# Patient Record
Sex: FEMALE | Race: ASIAN | NOT HISPANIC OR LATINO | URBAN - METROPOLITAN AREA
[De-identification: names, ages, dates, MRNs, and addresses within clinical notes are randomized per-mention and may not be internally consistent; named-entity substitution may affect disease eponyms.]

---
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
## 2021-02-12 ENCOUNTER — APPOINTMENT (OUTPATIENT)
Dept: INTERNAL MEDICINE | Facility: CLINIC | Age: 21
End: 2021-02-12
Payer: COMMERCIAL

## 2021-02-12 VITALS
HEIGHT: 66 IN | BODY MASS INDEX: 22.18 KG/M2 | DIASTOLIC BLOOD PRESSURE: 70 MMHG | WEIGHT: 138 LBS | HEART RATE: 82 BPM | TEMPERATURE: 98.4 F | OXYGEN SATURATION: 97 % | SYSTOLIC BLOOD PRESSURE: 100 MMHG

## 2021-02-12 PROCEDURE — 99072 ADDL SUPL MATRL&STAF TM PHE: CPT

## 2021-02-12 PROCEDURE — 99213 OFFICE O/P EST LOW 20 MIN: CPT

## 2021-02-12 NOTE — PLAN
[FreeTextEntry1] : Depression screening negative\par doing well on lexapro 10 mg. will continue same dose\par f/u 6 months for physical

## 2021-02-12 NOTE — HISTORY OF PRESENT ILLNESS
[FreeTextEntry1] : f/u anxiety [de-identified] : 19 y/o female with h/o anxiety who presents for follow up. She is on lexapro 10 mg. She feels very good on this dose. Able to sleep. Appetite is ok. headaches resolved. Doing well in school. She was away and didn’t have her pills for 2 days. She felt a little off and felt SOB. Feels better now that she is back on it.

## 2021-07-13 ENCOUNTER — APPOINTMENT (OUTPATIENT)
Dept: INTERNAL MEDICINE | Facility: CLINIC | Age: 21
End: 2021-07-13
Payer: COMMERCIAL

## 2021-07-13 VITALS
TEMPERATURE: 97.2 F | SYSTOLIC BLOOD PRESSURE: 90 MMHG | BODY MASS INDEX: 24.11 KG/M2 | HEIGHT: 66 IN | WEIGHT: 150 LBS | DIASTOLIC BLOOD PRESSURE: 70 MMHG | OXYGEN SATURATION: 99 % | HEART RATE: 87 BPM

## 2021-07-13 PROCEDURE — 99214 OFFICE O/P EST MOD 30 MIN: CPT

## 2021-07-13 NOTE — HEALTH RISK ASSESSMENT
[] : No [0] : 2) Feeling down, depressed, or hopeless: Not at all (0) [PHQ-2 Negative - No further assessment needed] : PHQ-2 Negative - No further assessment needed [[id number]] : 0

## 2021-07-13 NOTE — HISTORY OF PRESENT ILLNESS
[Parent] : parent [FreeTextEntry1] : f/u anxiety [de-identified] : 22 y/o female with h/o anxiety who presents for follow up. She is here with her mother. According  to her mother the patient has worseining anxiety symptoms. She mentioned that the patient had a dream that she wondered if it is real. Her mother contacted a therapist about 6 weeks ago. The therapist told the patient that may want to increase the dose of lexapro. She does not think she needs it increased. her appeitite is ok. She is sleeping. She is socializing. She is taking 1 class over the summer. S

## 2021-07-13 NOTE — PLAN
[FreeTextEntry1] : Depression screening negative\par For now will continue lexapro 10 mg. She will return in 1 month without her mother. If she feels in the meantime she wants to increase she will call me

## 2021-07-14 ENCOUNTER — TRANSCRIPTION ENCOUNTER (OUTPATIENT)
Age: 21
End: 2021-07-14

## 2021-08-19 ENCOUNTER — APPOINTMENT (OUTPATIENT)
Dept: INTERNAL MEDICINE | Facility: CLINIC | Age: 21
End: 2021-08-19
Payer: COMMERCIAL

## 2021-08-19 VITALS
HEIGHT: 66 IN | HEART RATE: 87 BPM | TEMPERATURE: 98.2 F | OXYGEN SATURATION: 98 % | SYSTOLIC BLOOD PRESSURE: 130 MMHG | DIASTOLIC BLOOD PRESSURE: 60 MMHG | BODY MASS INDEX: 24.11 KG/M2 | WEIGHT: 150 LBS

## 2021-08-19 DIAGNOSIS — F41.9 ANXIETY DISORDER, UNSPECIFIED: ICD-10-CM

## 2021-08-19 PROCEDURE — 99213 OFFICE O/P EST LOW 20 MIN: CPT

## 2021-08-19 NOTE — HISTORY OF PRESENT ILLNESS
[FreeTextEntry1] : f/u anxiety [de-identified] : 23 year-old female with history of anxiety who is here for follow-up.  She is on Lexapro 10 mg.  Last visit she was here with her mother who thought that She may need to increase the dose of Lexapro to 20 mg.  They stated that her therapist thought it may have been a good idea.  Since last visit she has been doing fine on Lexapro 10 mg.  The therapist said that she no longer needs to follow-up with him.

## 2021-08-19 NOTE — PLAN
[FreeTextEntry1] : She is stable on Lexapro 10 mg.  We will continue at that dosage.\par Return for physical

## 2021-09-27 ENCOUNTER — TRANSCRIPTION ENCOUNTER (OUTPATIENT)
Age: 21
End: 2021-09-27

## 2021-12-23 ENCOUNTER — NON-APPOINTMENT (OUTPATIENT)
Age: 21
End: 2021-12-23

## 2021-12-23 ENCOUNTER — APPOINTMENT (OUTPATIENT)
Dept: OPHTHALMOLOGY | Facility: CLINIC | Age: 21
End: 2021-12-23
Payer: COMMERCIAL

## 2021-12-23 PROCEDURE — 92004 COMPRE OPH EXAM NEW PT 1/>: CPT

## 2022-02-18 ENCOUNTER — APPOINTMENT (OUTPATIENT)
Dept: INTERNAL MEDICINE | Facility: CLINIC | Age: 22
End: 2022-02-18
Payer: COMMERCIAL

## 2022-02-18 VITALS
BODY MASS INDEX: 27.16 KG/M2 | OXYGEN SATURATION: 99 % | RESPIRATION RATE: 16 BRPM | HEART RATE: 90 BPM | TEMPERATURE: 97.7 F | HEIGHT: 66 IN | DIASTOLIC BLOOD PRESSURE: 82 MMHG | SYSTOLIC BLOOD PRESSURE: 135 MMHG | WEIGHT: 169 LBS

## 2022-02-18 DIAGNOSIS — Z00.00 ENCOUNTER FOR GENERAL ADULT MEDICAL EXAMINATION W/OUT ABNORMAL FINDINGS: ICD-10-CM

## 2022-02-18 PROCEDURE — 36415 COLL VENOUS BLD VENIPUNCTURE: CPT

## 2022-02-18 PROCEDURE — 99395 PREV VISIT EST AGE 18-39: CPT | Mod: 25

## 2022-02-18 RX ORDER — ESCITALOPRAM OXALATE 10 MG/1
10 TABLET ORAL
Qty: 90 | Refills: 0 | Status: DISCONTINUED | COMMUNITY
Start: 2020-07-23 | End: 2022-02-18

## 2022-02-18 NOTE — PLAN
[FreeTextEntry1] : Depression screening negative. stable on lexapro. \par BP is controlled\par Work to improve weight with healthy diet and exercise\par Check labs today, including CBC, CMP, TSH, HbA1c, lipids. Blood collected in office.\par pap\par letter written for school\par f/u 6  months or prn

## 2022-02-18 NOTE — HISTORY OF PRESENT ILLNESS
[FreeTextEntry1] : Annual physical [de-identified] : 21-year-old female with history of anxiety who is here for an annual physical.  She is on Lexapro 20 mg.  The dose was increased to 20 mg in September.  She states that she took a semester off school as she was adjusting to the dose.  She requests a letter because she would like to get reimbursed her tuition.\par never had a pap. not sexually active\par exercise: going to the gym occasionally\par diet is healthy\par

## 2022-02-18 NOTE — HEALTH RISK ASSESSMENT
[Never] : Never [No] : No [0] : 2) Feeling down, depressed, or hopeless: Not at all (0) [With Family] : lives with family [Student] : student [PHQ-2 Negative - No further assessment needed] : PHQ-2 Negative - No further assessment needed [HIV test declined] : HIV test declined [Hepatitis C test declined] : Hepatitis C test declined [Single] : single [[id number]] : 0 [Sexually Active] : not sexually active [Reports changes in hearing] : Reports no changes in hearing [Reports changes in vision] : Reports no changes in vision [Reports changes in dental health] : Reports no changes in dental health [de-identified] : NCC

## 2022-03-03 ENCOUNTER — TRANSCRIPTION ENCOUNTER (OUTPATIENT)
Age: 22
End: 2022-03-03

## 2022-03-03 DIAGNOSIS — E55.9 VITAMIN D DEFICIENCY, UNSPECIFIED: ICD-10-CM

## 2022-03-03 LAB
25(OH)D3 SERPL-MCNC: 6.5 NG/ML
ALBUMIN SERPL ELPH-MCNC: 4.8 G/DL
ALP BLD-CCNC: 80 U/L
ALT SERPL-CCNC: 11 U/L
ANION GAP SERPL CALC-SCNC: 15 MMOL/L
APPEARANCE: ABNORMAL
AST SERPL-CCNC: 13 U/L
BACTERIA: NEGATIVE
BASOPHILS # BLD AUTO: 0.03 K/UL
BASOPHILS NFR BLD AUTO: 0.4 %
BILIRUB SERPL-MCNC: 0.2 MG/DL
BILIRUBIN URINE: NEGATIVE
BLOOD URINE: ABNORMAL
BUN SERPL-MCNC: 17 MG/DL
CALCIUM SERPL-MCNC: 9.6 MG/DL
CHLORIDE SERPL-SCNC: 103 MMOL/L
CHOLEST SERPL-MCNC: 127 MG/DL
CO2 SERPL-SCNC: 20 MMOL/L
COLOR: YELLOW
CREAT SERPL-MCNC: 0.59 MG/DL
EOSINOPHIL # BLD AUTO: 0.17 K/UL
EOSINOPHIL NFR BLD AUTO: 2.2 %
ESTIMATED AVERAGE GLUCOSE: 117 MG/DL
FERRITIN SERPL-MCNC: 25 NG/ML
GLUCOSE QUALITATIVE U: NEGATIVE
GLUCOSE SERPL-MCNC: 85 MG/DL
HBA1C MFR BLD HPLC: 5.7 %
HCT VFR BLD CALC: 38.4 %
HDLC SERPL-MCNC: 44 MG/DL
HGB BLD-MCNC: 11.9 G/DL
HYALINE CASTS: 1 /LPF
IMM GRANULOCYTES NFR BLD AUTO: 0.4 %
IRON SATN MFR SERPL: 12 %
IRON SERPL-MCNC: 50 UG/DL
KETONES URINE: NEGATIVE
LDLC SERPL CALC-MCNC: 57 MG/DL
LEUKOCYTE ESTERASE URINE: ABNORMAL
LYMPHOCYTES # BLD AUTO: 2.08 K/UL
LYMPHOCYTES NFR BLD AUTO: 27 %
MAN DIFF?: NORMAL
MCHC RBC-ENTMCNC: 25.8 PG
MCHC RBC-ENTMCNC: 31 GM/DL
MCV RBC AUTO: 83.3 FL
MICROSCOPIC-UA: NORMAL
MONOCYTES # BLD AUTO: 0.51 K/UL
MONOCYTES NFR BLD AUTO: 6.6 %
NEUTROPHILS # BLD AUTO: 4.88 K/UL
NEUTROPHILS NFR BLD AUTO: 63.4 %
NITRITE URINE: NEGATIVE
NONHDLC SERPL-MCNC: 83 MG/DL
PH URINE: 6
PLATELET # BLD AUTO: 373 K/UL
POTASSIUM SERPL-SCNC: 3.9 MMOL/L
PROT SERPL-MCNC: 7.8 G/DL
PROTEIN URINE: NORMAL
RBC # BLD: 4.61 M/UL
RBC # FLD: 14.6 %
RED BLOOD CELLS URINE: 4 /HPF
SODIUM SERPL-SCNC: 139 MMOL/L
SPECIFIC GRAVITY URINE: 1.03
SQUAMOUS EPITHELIAL CELLS: 3 /HPF
TIBC SERPL-MCNC: 414 UG/DL
TRIGL SERPL-MCNC: 130 MG/DL
TSH SERPL-ACNC: 2.02 UIU/ML
UIBC SERPL-MCNC: 364 UG/DL
UROBILINOGEN URINE: NORMAL
VIT B12 SERPL-MCNC: 287 PG/ML
WBC # FLD AUTO: 7.7 K/UL
WHITE BLOOD CELLS URINE: 9 /HPF

## 2022-03-03 RX ORDER — CHOLECALCIFEROL (VITAMIN D3) 1250 MCG
1.25 MG CAPSULE ORAL
Qty: 12 | Refills: 1 | Status: ACTIVE | COMMUNITY
Start: 2022-03-03 | End: 1900-01-01

## 2022-03-07 ENCOUNTER — TRANSCRIPTION ENCOUNTER (OUTPATIENT)
Age: 22
End: 2022-03-07

## 2022-04-08 ENCOUNTER — TRANSCRIPTION ENCOUNTER (OUTPATIENT)
Age: 22
End: 2022-04-08

## 2022-04-08 ENCOUNTER — NON-APPOINTMENT (OUTPATIENT)
Age: 22
End: 2022-04-08

## 2022-04-11 ENCOUNTER — TRANSCRIPTION ENCOUNTER (OUTPATIENT)
Age: 22
End: 2022-04-11

## 2022-04-26 ENCOUNTER — TRANSCRIPTION ENCOUNTER (OUTPATIENT)
Age: 22
End: 2022-04-26

## 2022-05-24 ENCOUNTER — APPOINTMENT (OUTPATIENT)
Dept: PSYCHIATRY | Facility: CLINIC | Age: 22
End: 2022-05-24
Payer: COMMERCIAL

## 2022-05-24 DIAGNOSIS — F32.A DEPRESSION, UNSPECIFIED: ICD-10-CM

## 2022-05-24 PROCEDURE — 99205 OFFICE O/P NEW HI 60 MIN: CPT

## 2022-05-24 RX ORDER — CHLORHEXIDINE GLUCONATE 4 %
LIQUID (ML) TOPICAL
Refills: 0 | Status: COMPLETED | COMMUNITY
End: 2022-05-24

## 2022-06-13 ENCOUNTER — APPOINTMENT (OUTPATIENT)
Dept: PSYCHIATRY | Facility: CLINIC | Age: 22
End: 2022-06-13
Payer: COMMERCIAL

## 2022-06-13 PROCEDURE — 99214 OFFICE O/P EST MOD 30 MIN: CPT

## 2022-06-13 RX ORDER — BUPROPION HYDROCHLORIDE 75 MG/1
75 TABLET, FILM COATED ORAL
Qty: 30 | Refills: 0 | Status: COMPLETED | COMMUNITY
Start: 2022-05-24 | End: 2022-06-13

## 2022-07-11 ENCOUNTER — APPOINTMENT (OUTPATIENT)
Dept: PSYCHIATRY | Facility: CLINIC | Age: 22
End: 2022-07-11

## 2022-07-11 PROCEDURE — 99214 OFFICE O/P EST MOD 30 MIN: CPT

## 2022-07-26 ENCOUNTER — APPOINTMENT (OUTPATIENT)
Dept: PSYCHIATRY | Facility: CLINIC | Age: 22
End: 2022-07-26

## 2022-07-26 PROCEDURE — 99214 OFFICE O/P EST MOD 30 MIN: CPT

## 2022-08-21 ENCOUNTER — NON-APPOINTMENT (OUTPATIENT)
Age: 22
End: 2022-08-21

## 2022-09-26 ENCOUNTER — APPOINTMENT (OUTPATIENT)
Dept: PSYCHIATRY | Facility: CLINIC | Age: 22
End: 2022-09-26

## 2022-09-27 ENCOUNTER — APPOINTMENT (OUTPATIENT)
Dept: PSYCHIATRY | Facility: CLINIC | Age: 22
End: 2022-09-27

## 2022-09-27 PROCEDURE — 99214 OFFICE O/P EST MOD 30 MIN: CPT

## 2022-09-27 RX ORDER — BUPROPION HYDROCHLORIDE 150 MG/1
150 TABLET, EXTENDED RELEASE ORAL
Qty: 30 | Refills: 0 | Status: DISCONTINUED | COMMUNITY
Start: 2022-07-11

## 2022-12-19 ENCOUNTER — APPOINTMENT (OUTPATIENT)
Dept: PSYCHIATRY | Facility: CLINIC | Age: 22
End: 2022-12-19

## 2022-12-19 PROCEDURE — 99214 OFFICE O/P EST MOD 30 MIN: CPT

## 2022-12-19 NOTE — PAST MEDICAL HISTORY
[FreeTextEntry1] : H/O: depression and anxiety\par Inpatient hospitalization: denies. States she went to [facility] ER when she was 19 for depression June 2020. No medication given and just discharged her.  \par Past SI: denies\par Therapist: was seeing intermittently a therapist Dr. Moran but not seeing one currently.\par Psychiatrist: denpranav. was seeing her PMD for the Lexapro\par Medication trials: denies\par Current medications: Lexapro 20 mg for 2 years\par Firearms: denies \par Medical: denies\par

## 2022-12-19 NOTE — PHYSICAL EXAM
[None] : none [Anxious] : anxious [Normal] : alert, oriented to person, place, and time [Fair] : fair [FreeTextEntry8] : "I'm depressed." better with Lexapro [FreeTextEntry9] : better with Lexapro

## 2022-12-19 NOTE — DISCUSSION/SUMMARY
[FreeTextEntry1] : Assessment: Patient is a 23 yo [place] female with h/o depression and anxiety seen today for medication management. Patient is compliant with her medications, tolerating it well without any side effects. I-STOP was checked without any problems. \par \par PLAN:\par Continue Wellbutrin 300 mg PO QAM for depression, low motivation, energy, concentration and decrease SSRI induced sexual dysfunction.\par Continue Lexapro 20 mg PO QD for depression and anxiety\par - Discussed risks and benefits of medications including side effects of GI, and sexual with SSRI.  Alternative strategies including no intervention discussed with patient. Patient consents to current medications as prescribed.\par - Patient understands to contact clinic prn with concerns and agrees to call 911 or go to nearest ER if symptoms worsen.\par - Next appointment made in 6 month. Patient left the office without any distress.\par \par \par

## 2022-12-19 NOTE — CURRENT PSYCHIATRIC SYMPTOMS
[Depressed Mood] : depressed mood [Anhedonia] : anhedonia [Guilt] : feeling guilty [Decreased Concentration] : decreased concentrating ability [Excessive Worry] : excessive worry [Ruminations] : ruminations [de-identified] : better with Lexapro [de-identified] : denies [de-identified] : denies [de-identified] : better with Lexapro [de-identified] : denies [de-identified] : denies [de-identified] : denies

## 2022-12-19 NOTE — HISTORY OF PRESENT ILLNESS
[de-identified] : \par Patient is here in the office for face to face interview with writer for 3 month follow-up visit.\par \par Patient is here for the 3 months ago.  Patient is 15 min late for the appt. States she is doing very well. Mood iss table. Less depression and anxiety. States she is thinking about a portal note dated in 2021 that she did not like in regards to the content of the note. She was telling her mother about nightmares she was having and her mother told the doctor that and the doctor wrote in her note. I don’t want my personal business like that in the note. How can i get that removed?"  \par Sleep schedule is good. States he gets better sleep if she takes the Lexapro late afternoon than in the morning. Getting 7-8 hours per night. Appetite is good. Energy, concentration, motivation are better. Denies any AVH, SI or HI.  [de-identified] : Patient is here in the office for face to face interview for initial psychiatric evaluation \par \par ID: Pt is a 22 year-old  female, single, unemployed, living with her parents and siblings seen today for psychiatric evaluation and medications management. \par \par HPI: Patient has been suffering from depression and anxiety since 19 yoa. States at 19 she started seeing symptoms of depression such as low mood and anhedonia. Started Lexapro 10 mg at 20 yoa. States on 10 mg she was not feeling like herself. At 21 she started 20 mg and felt more like herself. Stressors contributing to her depression and anxiety are she is not working. 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 States she has not decided if she wants to transfer to [place] or [employer] [facility] [employer] [place]. Her paternal grandmother passed away in April 2020. End of May 2020 her depression symptoms increased. July 2020 she went to [facility] for depression due to this stressor. States she was in a  relationship. States she liked the boy and felt dependant on him but states he cheated on her and felt depressed due to that as well.    \par Currently on Lexapro 20 mg\par \par Depression: denies any low mood and anhedonia. Better with the Lexapro. \par Anxiety: endorses to anxiety in the form of worrying, rumination, fear of the unknown. States all this is better with the Lexapro  \par Sleep: sleeping 8-9 hours per night. \par Denies any problems with her  appetite. Energy, concentration, and motivation are all decreased. Denies any AVH, SI or HI. \par \par Hypomanic symptoms: denies\par \par Substance use hx: \par denies any substance use

## 2022-12-19 NOTE — SOCIAL HISTORY
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 not working\par /Kids: denies\par Abuse: denies \par Legal:  denies\par

## 2023-02-07 ENCOUNTER — APPOINTMENT (OUTPATIENT)
Dept: PSYCHIATRY | Facility: CLINIC | Age: 23
End: 2023-02-07

## 2023-03-14 ENCOUNTER — APPOINTMENT (OUTPATIENT)
Dept: INTERNAL MEDICINE | Facility: CLINIC | Age: 23
End: 2023-03-14

## 2023-04-12 ENCOUNTER — APPOINTMENT (OUTPATIENT)
Dept: PSYCHIATRY | Facility: CLINIC | Age: 23
End: 2023-04-12
Payer: COMMERCIAL

## 2023-04-12 PROCEDURE — 99214 OFFICE O/P EST MOD 30 MIN: CPT

## 2023-04-12 NOTE — DISCUSSION/SUMMARY
[FreeTextEntry1] : Assessment: Patient is a 23 yo Maltese female with h/o depression and anxiety seen today for medication management. Patient is compliant with her medications, tolerating it well without any side effects. I-STOP was checked without any problems. \par \par PLAN:\par Continue Wellbutrin 300 mg PO QAM for depression, low motivation, energy, concentration and decrease SSRI induced sexual dysfunction.\par Increase Lexapro 20 to 25 mg PO QD for depression and anxiety\par - Discussed risks and benefits of medications including side effects of GI, and sexual with SSRI.  Alternative strategies including no intervention discussed with patient. Patient consents to current medications as prescribed.\par - Patient understands to contact clinic prn with concerns and agrees to call 911 or go to nearest ER if symptoms worsen.\par - Next appointment made in 1 month. Patient left the office without any distress.\par \par \par

## 2023-04-12 NOTE — PAST MEDICAL HISTORY
[FreeTextEntry1] : H/O: depression and anxiety\par Inpatient hospitalization: denies. States she went to [facility] ER when she was 19 for depression June 2020. No medication given and just discharged her.  \par Past SI: denies\par Therapist: was seeing intermittently a therapist Dr. Moran but not seeing one currently.\par Psychiatrist: denpranav. was seeing her PMD for the Lexapro\par Medication trials: denies\par Current medications: Lexapro 20 mg for 2 years\par Firearms: denies \par Medical: denies\par

## 2023-04-12 NOTE — SOCIAL HISTORY
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 not working\par /Kids: denies\par Abuse: denies \par Legal:  denies\par

## 2023-04-12 NOTE — HISTORY OF PRESENT ILLNESS
[de-identified] : Patient is here in the office for face to face interview with writer for 3 month follow-up visit.\par \par Patient is seen accompanied with her mother. Mood is anxiety in the form of rumination. States that her uncle recently passed away. States the anxiety has been going on for the last month so it could be situational. Sleep schedule is good. Getting 10 hours per night. Appetite is good. Energy, concentration, motivation are better. Denies any AVH, SI or HI.  [de-identified] : Patient is here in the office for face to face interview for initial psychiatric evaluation \par \par ID: Pt is a 22 year-old  female, single, unemployed, living with her parents and siblings seen today for psychiatric evaluation and medications management. \par \par HPI: Patient has been suffering from depression and anxiety since 19 yoa. States at 19 she started seeing symptoms of depression such as low mood and anhedonia. Started Lexapro 10 mg at 20 yoa. States on 10 mg she was not feeling like herself. At 21 she started 20 mg and felt more like herself. Stressors contributing to her depression and anxiety are she is not working. 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 States she has not decided if she wants to transfer to [place] or [employer]. Her paternal grandmother passed away in April 2020. End of May 2020 her depression symptoms increased. July 2020 she went to [facility] for depression due to this stressor. States she was in a  relationship. States she liked the boy and felt dependant on him but states he cheated on her and felt depressed due to that as well.    \par Currently on Lexapro 20 mg\par \par Depression: denies any low mood and anhedonia. Better with the Lexapro. \par Anxiety: endorses to anxiety in the form of worrying, rumination, fear of the unknown. States all this is better with the Lexapro  \par Sleep: sleeping 8-9 hours per night. \par Denies any problems with her  appetite. Energy, concentration, and motivation are all decreased. Denies any AVH, SI or HI. \par \par Hypomanic symptoms: denies\par \par Substance use hx: \par denies any substance use

## 2023-04-17 ENCOUNTER — NON-APPOINTMENT (OUTPATIENT)
Age: 23
End: 2023-04-17

## 2023-05-15 ENCOUNTER — APPOINTMENT (OUTPATIENT)
Dept: PSYCHIATRY | Facility: CLINIC | Age: 23
End: 2023-05-15

## 2023-05-17 ENCOUNTER — APPOINTMENT (OUTPATIENT)
Dept: PSYCHIATRY | Facility: CLINIC | Age: 23
End: 2023-05-17
Payer: COMMERCIAL

## 2023-05-17 PROCEDURE — 99214 OFFICE O/P EST MOD 30 MIN: CPT | Mod: 95

## 2023-05-17 NOTE — SOCIAL HISTORY
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 not working\par /Kids: denies\par Abuse: denies \par Legal:  denies\par

## 2023-05-17 NOTE — DISCUSSION/SUMMARY
[FreeTextEntry1] : Assessment: Patient is a 21 yo Pakistani female with h/o depression and anxiety seen today for medication management. Patient is compliant with her medications, tolerating it well without any side effects. I-STOP was checked without any problems. \par \par PLAN:\par Continue Wellbutrin 300 mg PO QAM for depression, low motivation, energy, concentration and decrease SSRI induced sexual dysfunction.\par Increase Lexapro 20 to 30 mg PO QD for depression and anxiety\par - Discussed risks and benefits of medications including side effects of GI, and sexual with SSRI.  Alternative strategies including no intervention discussed with patient. Patient consents to current medications as prescribed.\par - Patient understands to contact clinic prn with concerns and agrees to call 911 or go to nearest ER if symptoms worsen.\par - Next appointment made in 3 month. Patient left the office without any distress.\par \par \par

## 2023-05-17 NOTE — PAST MEDICAL HISTORY
[FreeTextEntry1] : H/O: depression and anxiety\par Inpatient hospitalization: denies. States she went to [facility] ER when she was 19 for depression June 2020. No medication given and just discharged her.  \par Past SI: denies\par Therapist: was seeing intermittently a therapist Dr. Moran but not seeing one currently.\par Psychiatrist: denpranav. was seeing her PMD for the Lexapro\par Medication trials: denies\par Current medications: Lexapro 20 mg for 2 years\par Firearms: denies \par Medical: denies\par

## 2023-05-17 NOTE — HISTORY OF PRESENT ILLNESS
[Home] : at home, [unfilled] , at the time of the visit. [Medical Office: ([facility])___] : at the medical office located in  [Verbal consent obtained from patient] : the patient, [unfilled] [de-identified] : \par Patient is here for 1 month followup visit via telehealth\par \par Last month Lexapro was increased from 20 to 25 mg. Patient states with 25 she didn't see much a difference but with 30 mg she did (Took two 5 mg). \par Mood: less depression, anxiety, irritability during period. More calmer. \par Sleeping: Sleep schedule is good. Getting 8 hours per night. Appetite is good. Energy, concentration, motivation are better. Denies any AVH, SI or HI.  [de-identified] : Patient is here in the office for face to face interview for initial psychiatric evaluation \par \par ID: Pt is a 22 year-old  female, single, unemployed, living with her parents and siblings seen today for psychiatric evaluation and medications management. \par \par HPI: Patient has been suffering from depression and anxiety since 19 yoa. States at 19 she started seeing symptoms of depression such as low mood and anhedonia. Started Lexapro 10 mg at 20 yoa. States on 10 mg she was not feeling like herself. At 21 she started 20 mg and felt more like herself. Stressors contributing to her depression and anxiety are she is not working. 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 States she has not decided if she wants to transfer to [place] or [employer]. Her paternal grandmother passed away in April 2020. End of May 2020 her depression symptoms increased. July 2020 she went to [facility] for depression due to this stressor. States she was in a  relationship. States she liked the boy and felt dependant on him but states he cheated on her and felt depressed due to that as well.    \par Currently on Lexapro 20 mg\par \par Depression: denies any low mood and anhedonia. Better with the Lexapro. \par Anxiety: endorses to anxiety in the form of worrying, rumination, fear of the unknown. States all this is better with the Lexapro  \par Sleep: sleeping 8-9 hours per night. \par Denies any problems with her  appetite. Energy, concentration, and motivation are all decreased. Denies any AVH, SI or HI. \par \par Hypomanic symptoms: denies\par \par Substance use hx: \par denies any substance use

## 2023-05-31 ENCOUNTER — TRANSCRIPTION ENCOUNTER (OUTPATIENT)
Age: 23
End: 2023-05-31

## 2023-06-12 ENCOUNTER — APPOINTMENT (OUTPATIENT)
Dept: PSYCHIATRY | Facility: CLINIC | Age: 23
End: 2023-06-12

## 2023-06-13 ENCOUNTER — APPOINTMENT (OUTPATIENT)
Dept: PSYCHIATRY | Facility: CLINIC | Age: 23
End: 2023-06-13
Payer: COMMERCIAL

## 2023-06-13 PROCEDURE — 99214 OFFICE O/P EST MOD 30 MIN: CPT

## 2023-06-13 NOTE — PAST MEDICAL HISTORY
[FreeTextEntry1] : H/O: depression and anxiety\par Inpatient hospitalization: denies. States she went to [facility] ER when she was 19 for depression June 2020. No medication given and just discharged her.  \par Past SI: denies\par Therapist: was seeing intermittently a therapist Dr. Moran but not seeing one currently.\par Psychiatrist: denpranav. was seeing her PMD for the Lexapro\par Medication trials: denies\par Current medications: Lexapro 20 mg for 2 years\par Firearms: denies \par Medical: denies\par

## 2023-06-13 NOTE — HISTORY OF PRESENT ILLNESS
[de-identified] : \par Patient is here in the office for face to face interview with writer for 1 month follow-up visit.\par \par Last month Lexapro 20 was increased to 30 mg. States she has good days and she has bad days. Feels it is situational. Keeps thinking about her uncle and her friend who passed away. Mood is nto down everyday. . Still gets irritable still 1 week prior to the period.  \par Mood: depression, anxiety, irritability during period 2-3 days a week. More calmer. \par Sleeping: Sleep schedule is good. Getting 8 hours per night. Appetite is good. Energy, concentration, motivation are better. Denies any AVH, SI or HI. Referred her for therapy in this office.  [de-identified] : Patient is here in the office for face to face interview for initial psychiatric evaluation \par \par ID: Pt is a 22 year-old  female, single, unemployed, living with her parents and siblings seen today for psychiatric evaluation and medications management. \par \par HPI: Patient has been suffering from depression and anxiety since 19 yoa. States at 19 she started seeing symptoms of depression such as low mood and anhedonia. Started Lexapro 10 mg at 20 yoa. States on 10 mg she was not feeling like herself. At 21 she started 20 mg and felt more like herself. Stressors contributing to her depression and anxiety are she is not working. 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 States she has not decided if she wants to transfer to [place] or [employer]. Her paternal grandmother passed away in April 2020. End of May 2020 her depression symptoms increased. July 2020 she went to [facility] for depression due to this stressor. States she was in a  relationship. States she liked the boy and felt dependant on him but states he cheated on her and felt depressed due to that as well.    \par Currently on Lexapro 20 mg\par \par Depression: denies any low mood and anhedonia. Better with the Lexapro. \par Anxiety: endorses to anxiety in the form of worrying, rumination, fear of the unknown. States all this is better with the Lexapro  \par Sleep: sleeping 8-9 hours per night. \par Denies any problems with her  appetite. Energy, concentration, and motivation are all decreased. Denies any AVH, SI or HI. \par \par Hypomanic symptoms: denies\par \par Substance use hx: \par denies any substance use

## 2023-06-13 NOTE — DISCUSSION/SUMMARY
[FreeTextEntry1] : Assessment: Patient is a 23 yo Monegasque female with h/o depression and anxiety seen today for medication management. Patient is compliant with her medications, tolerating it well without any side effects. I-STOP was checked without any problems. \par \par PLAN:\par Continue Wellbutrin 300 mg PO QAM for depression, low motivation, energy, concentration and decrease SSRI induced sexual dysfunction.\par Continue Lexapro 20 to 30 mg PO QD for depression and anxiety\par - Discussed risks and benefits of medications including side effects of GI, and sexual with SSRI.  Alternative strategies including no intervention discussed with patient. Patient consents to current medications as prescribed.\par - Patient understands to contact clinic prn with concerns and agrees to call 911 or go to nearest ER if symptoms worsen.\par - Next appointment made in 1 month. Patient left the office without any distress.\par \par \par

## 2023-06-13 NOTE — SOCIAL HISTORY
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 not working\par /Kids: denies\par Abuse: denies \par Legal:  denies\par

## 2023-07-10 ENCOUNTER — APPOINTMENT (OUTPATIENT)
Dept: PSYCHIATRY | Facility: CLINIC | Age: 23
End: 2023-07-10
Payer: COMMERCIAL

## 2023-07-10 PROCEDURE — 99214 OFFICE O/P EST MOD 30 MIN: CPT

## 2023-07-10 NOTE — PAST MEDICAL HISTORY
[FreeTextEntry1] : H/O: depression and anxiety\par Inpatient hospitalization: denies. States she went to [facility] ER when she was 19 for depression June 2020. No medication given and just discharged her.  \par Past SI: denies\par Therapist: was seeing intermittently a therapist Dr. Moran but not seeing one currently.\par Psychiatrist: denpranav. was seeing her PMD for the Lexapro\par Medication trials: denies\par Current medications: Lexapro 20 mg for 2 years\par Firearms: denies \par Medical: denies\par

## 2023-07-10 NOTE — DISCUSSION/SUMMARY
[FreeTextEntry1] : Assessment: Patient is a 23 yo Norwegian female with h/o depression and anxiety seen today for medication management. Patient is compliant with her medications, tolerating it well without any side effects. I-STOP was checked without any problems. \par \par PLAN:\par Continue Wellbutrin 300 mg PO QAM for depression, low motivation, energy, concentration and decrease SSRI induced sexual dysfunction.\par Continue Lexapro 30 mg PO QD for depression and anxiety\par - Discussed risks and benefits of medications including side effects of GI, and sexual with SSRI.  Alternative strategies including no intervention discussed with patient. Patient consents to current medications as prescribed.\par - Patient understands to contact clinic prn with concerns and agrees to call 911 or go to nearest ER if symptoms worsen.\par - Next appointment made in 3 month. Patient left the office without any distress.\par \par \par

## 2023-07-10 NOTE — SOCIAL HISTORY
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 not working\par /Kids: denies\par Abuse: denies \par Legal:  denies\par

## 2023-07-10 NOTE — HISTORY OF PRESENT ILLNESS
[de-identified] : \par Patient is here in the office for face to face interview with writer for 1 month follow-up visit.\par \par No medication changes last month. States she is doing very good. Uncle and friend who passed away does not think about it as often. Less irritable 1 week prior to the period.  \par Mood: less depression, anxiety, irritability More calmer. \par Sleeping: Sleep schedule is good. Getting 8 hours per night. Does not wake up in the middle of the night. Appetite is good. Energy, concentration, motivation are better. Denies any AVH, SI or HI. Referred her for therapy in this office.  [de-identified] : Patient is here in the office for face to face interview for initial psychiatric evaluation \par \par ID: Pt is a 22 year-old  female, single, unemployed, living with her parents and siblings seen today for psychiatric evaluation and medications management. \par \par HPI: Patient has been suffering from depression and anxiety since 19 yoa. States at 19 she started seeing symptoms of depression such as low mood and anhedonia. Started Lexapro 10 mg at 20 yoa. States on 10 mg she was not feeling like herself. At 21 she started 20 mg and felt more like herself. Stressors contributing to her depression and anxiety are she is not working. 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 States she has not decided if she wants to transfer to [place] or [employer]. Her paternal grandmother passed away in April 2020. End of May 2020 her depression symptoms increased. July 2020 she went to [facility] for depression due to this stressor. States she was in a  relationship. States she liked the boy and felt dependant on him but states he cheated on her and felt depressed due to that as well.    \par Currently on Lexapro 20 mg\par \par Depression: denies any low mood and anhedonia. Better with the Lexapro. \par Anxiety: endorses to anxiety in the form of worrying, rumination, fear of the unknown. States all this is better with the Lexapro  \par Sleep: sleeping 8-9 hours per night. \par Denies any problems with her  appetite. Energy, concentration, and motivation are all decreased. Denies any AVH, SI or HI. \par \par Hypomanic symptoms: denies\par \par Substance use hx: \par denies any substance use

## 2023-09-22 ENCOUNTER — NON-APPOINTMENT (OUTPATIENT)
Age: 23
End: 2023-09-22

## 2023-10-09 ENCOUNTER — APPOINTMENT (OUTPATIENT)
Dept: PSYCHIATRY | Facility: CLINIC | Age: 23
End: 2023-10-09

## 2023-11-03 ENCOUNTER — APPOINTMENT (OUTPATIENT)
Dept: PSYCHIATRY | Facility: CLINIC | Age: 23
End: 2023-11-03

## 2023-11-06 ENCOUNTER — APPOINTMENT (OUTPATIENT)
Dept: PSYCHIATRY | Facility: CLINIC | Age: 23
End: 2023-11-06

## 2023-11-28 ENCOUNTER — APPOINTMENT (OUTPATIENT)
Dept: PSYCHIATRY | Facility: CLINIC | Age: 23
End: 2023-11-28

## 2023-12-08 ENCOUNTER — APPOINTMENT (OUTPATIENT)
Dept: PSYCHIATRY | Facility: CLINIC | Age: 23
End: 2023-12-08
Payer: COMMERCIAL

## 2023-12-08 PROCEDURE — 99214 OFFICE O/P EST MOD 30 MIN: CPT

## 2024-01-12 ENCOUNTER — APPOINTMENT (OUTPATIENT)
Dept: PSYCHIATRY | Facility: CLINIC | Age: 24
End: 2024-01-12
Payer: COMMERCIAL

## 2024-01-12 PROCEDURE — 99214 OFFICE O/P EST MOD 30 MIN: CPT

## 2024-01-12 NOTE — SOCIAL HISTORY
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 not working\par  /Kids: denies\par  Abuse: denies \par  Legal:  denies\par

## 2024-01-12 NOTE — PAST MEDICAL HISTORY
[FreeTextEntry1] : H/O: depression and anxiety\par  Inpatient hospitalization: denies. States she went to [facility] ER when she was 19 for depression June 2020. No medication given and just discharged her.  \par  Past SI: denies\par  Therapist: was seeing intermittently a therapist Dr. Moran but not seeing one currently.\par  Psychiatrist: denpranav. was seeing her PMD for the Lexapro\par  Medication trials: denies\par  Current medications: Lexapro 20 mg for 2 years\par  Firearms: denies \par  Medical: denies\par

## 2024-01-12 NOTE — HISTORY OF PRESENT ILLNESS
[de-identified] : Patient is here in the office for face to face interview with writer for 1 month follow-up visit.  No medication changes last month. States she has been having this twitching in her eye which could be due to the Wellbutrin. States she has been having some trouble with ehr therapist Genesis Kern . States she has blocked her number and will not return her phone calls. Has been seeing her for some time.   Currently on Wellbutrin  mg and Lexapro 30 mg.   States she is doing very good. Excited to go back to school on Jan 29.  Mood: less depression, anxiety, irritability More calmer.  Sleeping: Getting 8 hours per night. Does not wake up in the middle of the night.  Appetite is less with the Wellbutrin. At night she feels hungry like at 2 or 3 am.  Energy, concentration, motivation are better. Denies any AVH, SI or HI.   [de-identified] : Patient is here in the office for face to face interview for initial psychiatric evaluation \par  \par  ID: Pt is a 22 year-old Vincentian female, single, unemployed, living with her parents and siblings seen today for psychiatric evaluation and medications management. \par  \par  HPI: Patient has been suffering from depression and anxiety since 19 yoa. States at 19 she started seeing symptoms of depression such as low mood and anhedonia. Started Lexapro 10 mg at 20 yoa. States on 10 mg she was not feeling like herself. At 21 she started 20 mg and felt more like herself. Stressors contributing to her depression and anxiety are she is not working. 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 States she has not decided if she wants to transfer to [place] or [employer] [facility] [place]. Her paternal grandmother passed away in April 2020. End of May 2020 her depression symptoms increased. July 2020 she went to [facility] for depression due to this stressor. States she was in a  relationship. States she liked the boy and felt dependant on him but states he cheated on her and felt depressed due to that as well.    \par  Currently on Lexapro 20 mg\par  \par  Depression: denies any low mood and anhedonia. Better with the Lexapro. \par  Anxiety: endorses to anxiety in the form of worrying, rumination, fear of the unknown. States all this is better with the Lexapro  \par  Sleep: sleeping 8-9 hours per night. \par  Denies any problems with her  appetite. Energy, concentration, and motivation are all decreased. Denies any AVH, SI or HI. \par  \par  Hypomanic symptoms: denies\par  \par  Substance use hx: \par  denies any substance use

## 2024-03-08 ENCOUNTER — APPOINTMENT (OUTPATIENT)
Dept: PSYCHIATRY | Facility: CLINIC | Age: 24
End: 2024-03-08
Payer: COMMERCIAL

## 2024-03-08 PROCEDURE — 99214 OFFICE O/P EST MOD 30 MIN: CPT

## 2024-03-08 NOTE — PAST MEDICAL HISTORY
[FreeTextEntry1] : H/O: depression and anxiety\par  Inpatient hospitalization: denies. States she went to [facility] ER when she was 19 for depression June 2020. No medication given and just discharged her.  \par  Past SI: denies\par  Therapist: was seeing intermittently a therapist Dr. Moran but not seeing one currently.\par  Psychiatrist: denpranav. was seeing her PMD for the Lexapro\par  Medication trials: denies\par  Current medications: Lexapro 20 mg for 2 years\par  Firearms: denies \par  Medical: denies\par

## 2024-03-08 NOTE — SOCIAL HISTORY
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 not working\par  /Kids: denies\par  Abuse: denies \par  Legal:  denies\par

## 2024-03-08 NOTE — PHYSICAL EXAM
[None] : none [Normal] : alert, oriented to person, place, and time [Anxious] : anxious [Fair] : fair [FreeTextEntry9] : better with Lexapro [FreeTextEntry8] : "I'm depressed." better with Lexapro

## 2024-03-08 NOTE — HISTORY OF PRESENT ILLNESS
[de-identified] : Patient is here in the office for face to face interview with writer for  month follow-up visit.  At that Wellbutrin 450 mg was decreased to 300 mg. States she didn't know, and she stayed on the Wellbutrin 450 mg. States she would like to decrease the Wellbutrin to 300 mg now.   Currently on Wellbutrin  mg and Lexapro 30 mg.   Mood: less depression, anxiety, irritability More calmer.  Sleeping: Getting 8 hours per night. Does not wake up in the middle of the night.  Appetite is good.  Energy, concentration, motivation are better. Denies any AVH, SI or HI.   [de-identified] : Patient is here in the office for face to face interview for initial psychiatric evaluation \par  \par  ID: Pt is a 22 year-old [place] female, single, unemployed, living with her parents and siblings seen today for psychiatric evaluation and medications management. \par  \par  HPI: Patient has been suffering from depression and anxiety since 19 yoa. States at 19 she started seeing symptoms of depression such as low mood and anhedonia. Started Lexapro 10 mg at 20 yoa. States on 10 mg she was not feeling like herself. At 21 she started 20 mg and felt more like herself. Stressors contributing to her depression and anxiety are she is not working. 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 States she has not decided if she wants to transfer to [place] or [employer]. Her paternal grandmother passed away in April 2020. End of May 2020 her depression symptoms increased. July 2020 she went to [facility] for depression due to this stressor. States she was in a  relationship. States she liked the boy and felt dependant on him but states he cheated on her and felt depressed due to that as well.    \par  Currently on Lexapro 20 mg\par  \par  Depression: denies any low mood and anhedonia. Better with the Lexapro. \par  Anxiety: endorses to anxiety in the form of worrying, rumination, fear of the unknown. States all this is better with the Lexapro  \par  Sleep: sleeping 8-9 hours per night. \par  Denies any problems with her  appetite. Energy, concentration, and motivation are all decreased. Denies any AVH, SI or HI. \par  \par  Hypomanic symptoms: denies\par  \par  Substance use hx: \par  denies any substance use

## 2024-03-08 NOTE — DISCUSSION/SUMMARY
[FreeTextEntry1] : Assessment: Patient is a 23 yo Rw[place] female with h/o depression and anxiety seen today for medication management. Patient is compliant with her medications, tolerating it well without any side effects. I-STOP was checked without any problems.   PLAN: Decrease Wellbutrin 450 to 300 mg PO QAM for depression, low motivation, energy, concentration and decrease SSRI induced sexual dysfunction. Continue Lexapro 30 mg PO QD for depression and anxiety - Discussed risks and benefits of medications including side effects of GI, and sexual with SSRI.  Alternative strategies including no intervention discussed with patient. Patient consents to current medications as prescribed. - Patient understands to contact clinic prn with concerns and agrees to call 911 or go to nearest ER if symptoms worsen. - Next appointment made in 1 month. Patient left the office without any distress.   
rolling walker

## 2024-04-05 ENCOUNTER — APPOINTMENT (OUTPATIENT)
Dept: PSYCHIATRY | Facility: CLINIC | Age: 24
End: 2024-04-05

## 2024-04-17 ENCOUNTER — APPOINTMENT (OUTPATIENT)
Dept: PSYCHIATRY | Facility: CLINIC | Age: 24
End: 2024-04-17
Payer: COMMERCIAL

## 2024-04-17 PROCEDURE — 99214 OFFICE O/P EST MOD 30 MIN: CPT | Mod: 95

## 2024-04-17 RX ORDER — BUPROPION HYDROCHLORIDE 300 MG/1
300 TABLET, EXTENDED RELEASE ORAL DAILY
Qty: 90 | Refills: 0 | Status: ACTIVE | COMMUNITY
Start: 2022-06-13 | End: 1900-01-01

## 2024-04-17 RX ORDER — BUPROPION HYDROCHLORIDE 150 MG/1
150 TABLET, EXTENDED RELEASE ORAL
Qty: 90 | Refills: 0 | Status: ACTIVE | COMMUNITY
Start: 2023-08-16 | End: 1900-01-01

## 2024-04-17 NOTE — SOCIAL HISTORY
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 not working\par  /Kids: denies\par  Abuse: denies \par  Legal:  denies\par

## 2024-04-17 NOTE — PAST MEDICAL HISTORY
[FreeTextEntry1] : H/O: depression and anxiety\par  Inpatient hospitalization: denies. States she went to [facility] ER when she was 19 for depression June 2020. No medication given and just discharged her.  \par  Past SI: denies\par  Therapist: was seeing intermittently a therapist Dr. Moran but not seeing one currently.\par  Psychiatrist: denpranav. was seeing her PMD for the Lexapro\par  Medication trials: denies\par  Current medications: Lexapro 20 mg for 2 years\par  Firearms: denies \par  Medical: denies\par

## 2024-04-17 NOTE — HISTORY OF PRESENT ILLNESS
[de-identified] : Patient is here in the office for face to face interview with writer for  month follow-up visit.  Last month Wellbutrin  was decreased to 300 mg. Patient states she didn't like the decrease as she had less energy so she wanted to increase the dose back to 450 mg.   Mood: less depression, anxiety, irritability More calmer.  Sleeping: Getting 8 hours per night. Does not wake up in the middle of the night.  Appetite is good.  Energy, concentration, motivation are better. Denies any AVH, SI or HI.   [de-identified] : Patient is here in the office for face to face interview for initial psychiatric evaluation \par  \par  ID: Pt is a 22 year-old [place] female, single, unemployed, living with her parents and siblings seen today for psychiatric evaluation and medications management. \par  \par  HPI: Patient has been suffering from depression and anxiety since 19 yoa. States at 19 she started seeing symptoms of depression such as low mood and anhedonia. Started Lexapro 10 mg at 20 yoa. States on 10 mg she was not feeling like herself. At 21 she started 20 mg and felt more like herself. Stressors contributing to her depression and anxiety are she is not working. 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 States she has not decided if she wants to transfer to [place] or [employer] [facility]. Her paternal grandmother passed away in April 2020. End of May 2020 her depression symptoms increased. July 2020 she went to [facility] for depression due to this stressor. States she was in a  relationship. States she liked the boy and felt dependant on him but states he cheated on her and felt depressed due to that as well.    \par  Currently on Lexapro 20 mg\par  \par  Depression: denies any low mood and anhedonia. Better with the Lexapro. \par  Anxiety: endorses to anxiety in the form of worrying, rumination, fear of the unknown. States all this is better with the Lexapro  \par  Sleep: sleeping 8-9 hours per night. \par  Denies any problems with her  appetite. Energy, concentration, and motivation are all decreased. Denies any AVH, SI or HI. \par  \par  Hypomanic symptoms: denies\par  \par  Substance use hx: \par  denies any substance use

## 2024-04-17 NOTE — DISCUSSION/SUMMARY
[FreeTextEntry1] : Assessment: Patient is a 21 yo [place] female with h/o depression and anxiety seen today for medication management. Patient is compliant with her medications, tolerating it well without any side effects. I-STOP was checked without any problems.   PLAN: Increase Wellbutrin 300 to 450 mg PO QAM for depression, low motivation, energy, concentration and decrease SSRI induced sexual dysfunction. Continue Lexapro 30 mg PO QD for depression and anxiety - Discussed risks and benefits of medications including side effects of GI, and sexual with SSRI.  Alternative strategies including no intervention discussed with patient. Patient consents to current medications as prescribed. - Patient understands to contact clinic prn with concerns and agrees to call 911 or go to nearest ER if symptoms worsen. - Next appointment made in 3 month. Patient was not ion any distress.

## 2024-04-24 RX ORDER — ESCITALOPRAM OXALATE 10 MG/1
10 TABLET ORAL
Qty: 30 | Refills: 0 | Status: ACTIVE | COMMUNITY
Start: 2023-04-12 | End: 1900-01-01

## 2024-06-10 ENCOUNTER — RX RENEWAL (OUTPATIENT)
Age: 24
End: 2024-06-10

## 2024-06-10 RX ORDER — ESCITALOPRAM OXALATE 20 MG/1
20 TABLET ORAL
Qty: 90 | Refills: 0 | Status: ACTIVE | COMMUNITY
Start: 2021-09-27 | End: 1900-01-01

## 2024-06-18 ENCOUNTER — APPOINTMENT (OUTPATIENT)
Dept: PSYCHIATRY | Facility: CLINIC | Age: 24
End: 2024-06-18

## 2024-07-23 ENCOUNTER — APPOINTMENT (OUTPATIENT)
Dept: PSYCHIATRY | Facility: CLINIC | Age: 24
End: 2024-07-23

## 2024-07-23 PROCEDURE — NSD00D NO SHOW FEE: CUSTOM

## 2024-08-20 ENCOUNTER — APPOINTMENT (OUTPATIENT)
Dept: PSYCHIATRY | Facility: CLINIC | Age: 24
End: 2024-08-20

## 2024-08-23 ENCOUNTER — APPOINTMENT (OUTPATIENT)
Dept: PSYCHIATRY | Facility: CLINIC | Age: 24
End: 2024-08-23
Payer: COMMERCIAL

## 2024-08-23 PROCEDURE — 99214 OFFICE O/P EST MOD 30 MIN: CPT

## 2024-08-23 RX ORDER — PROPRANOLOL HYDROCHLORIDE 10 MG/1
10 TABLET ORAL DAILY
Qty: 30 | Refills: 0 | Status: ACTIVE | COMMUNITY
Start: 2024-08-23 | End: 1900-01-01

## 2024-08-23 NOTE — HISTORY OF PRESENT ILLNESS
[de-identified] : Patient is here in the office for face to face interview with writer for  month follow-up visit.  Last seen in April 2024. Patient is 15 min late for the appt. "Sorry there was a lot of traffic."  In April Wellbutrin 300 was increased to 450 mg. States she is on it and it is helping her. Feels it is too much due to increase anxiety, irritability.   Patient feels she is living in a toxic house. Feels nothing is her fault and blames it on the parents and her family. Describes to writer that her brother has anger issues, her father she feels has mood fluctuation "One day he is good and one day he is so angry at me." I feel they pick on everything. If i am just lying down watching TV they day WWhy i am lying down, not exercising, getting fat. I can never make them happy."   Mood: anxiety. Seen to be dysphoric in the room.  Sleeping: decreased. 5 hours per night.  Appetite is good.  Energy, concentration, motivation are all decreased. Denies any AVH, SI or HI.  Starting scho01; end of Aug.  [de-identified] : Patient is here in the office for face to face interview for initial psychiatric evaluation \par  \par  ID: Pt is a 22 year-old Citizen of [place] female, single, unemployed, living with her parents and siblings seen today for psychiatric evaluation and medications management. \par  \par  HPI: Patient has been suffering from depression and anxiety since 19 yoa. States at 19 she started seeing symptoms of depression such as low mood and anhedonia. Started Lexapro 10 mg at 20 yoa. States on 10 mg she was not feeling like herself. At 21 she started 20 mg and felt more like herself. Stressors contributing to her depression and anxiety are she is not working. 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 States she has not decided if she wants to transfer to [employer] or \[facility]\"". Her paternal grandmother passed away in April 2020. End of May 2020 her depression symptoms increased. July 2020 she went to [facility] for depression due to this stressor. States she was in a  relationship. States she liked the boy and felt dependant on him but states he cheated on her and felt depressed due to that as well.    \par  Currently on Lexapro 20 mg\par  \par  Depression: denies any low mood and anhedonia. Better with the Lexapro. \par  Anxiety: endorses to anxiety in the form of worrying, rumination, fear of the unknown. States all this is better with the Lexapro  \par  Sleep: sleeping 8-9 hours per night. \par  Denies any problems with her  appetite. Energy, concentration, and motivation are all decreased. Denies any AVH, SI or HI. \par  \par  Hypomanic symptoms: denies\par  \par  Substance use hx: \par  denies any substance use

## 2024-08-23 NOTE — DISCUSSION/SUMMARY
[FreeTextEntry1] : Assessment: Patient is a 23 yo [place] female with h/o depression and anxiety seen today for medication management. Patient is compliant with her medications, tolerating it well without any side effects. I-STOP was checked without any problems.   PLAN: Start Propranolol 10 mg PO QD fro anxiety Decrease Wellbutrin from 450 to 300 mg PO QAM for depression, low motivation, energy, concentration and decrease SSRI induced sexual dysfunction. Continue Lexapro 30 mg PO QD for depression and anxiety - Discussed risks and benefits of medications including side effects of GI, and sexual with SSRI.  Alternative strategies including no intervention discussed with patient. Patient consents to current medications as prescribed. - Patient understands to contact clinic prn with concerns and agrees to call 911 or go to nearest ER if symptoms worsen. - Next appointment made in 1 month. Patient was not ion any distress.

## 2024-10-01 ENCOUNTER — APPOINTMENT (OUTPATIENT)
Dept: PSYCHIATRY | Facility: CLINIC | Age: 24
End: 2024-10-01
Payer: SELF-PAY

## 2024-10-01 PROCEDURE — NSD00D NO SHOW FEE: CUSTOM

## 2024-11-01 ENCOUNTER — APPOINTMENT (OUTPATIENT)
Dept: PSYCHIATRY | Facility: CLINIC | Age: 24
End: 2024-11-01
Payer: SELF-PAY

## 2024-11-01 PROCEDURE — NSD00D NO SHOW FEE: CUSTOM

## 2024-12-03 ENCOUNTER — EMERGENCY (EMERGENCY)
Facility: HOSPITAL | Age: 24
LOS: 1 days | Discharge: HOME | End: 2024-12-03
Attending: STUDENT IN AN ORGANIZED HEALTH CARE EDUCATION/TRAINING PROGRAM | Admitting: STUDENT IN AN ORGANIZED HEALTH CARE EDUCATION/TRAINING PROGRAM
Payer: COMMERCIAL

## 2024-12-03 ENCOUNTER — NON-APPOINTMENT (OUTPATIENT)
Age: 24
End: 2024-12-03

## 2024-12-03 VITALS
TEMPERATURE: 98 F | SYSTOLIC BLOOD PRESSURE: 113 MMHG | DIASTOLIC BLOOD PRESSURE: 80 MMHG | OXYGEN SATURATION: 99 % | WEIGHT: 160.06 LBS | HEIGHT: 66 IN | RESPIRATION RATE: 20 BRPM | HEART RATE: 77 BPM

## 2024-12-03 DIAGNOSIS — F41.1 GENERALIZED ANXIETY DISORDER: ICD-10-CM

## 2024-12-03 LAB
ALBUMIN SERPL ELPH-MCNC: 4.1 G/DL — SIGNIFICANT CHANGE UP (ref 3.3–5)
ALP SERPL-CCNC: 59 U/L — SIGNIFICANT CHANGE UP (ref 40–120)
ALT FLD-CCNC: 21 U/L — SIGNIFICANT CHANGE UP (ref 12–78)
ANION GAP SERPL CALC-SCNC: 7 MMOL/L — SIGNIFICANT CHANGE UP (ref 5–17)
APAP SERPL-MCNC: 2 UG/ML — SIGNIFICANT CHANGE UP (ref 10–30)
AST SERPL-CCNC: 11 U/L — LOW (ref 15–37)
BASOPHILS # BLD AUTO: 0.03 K/UL — SIGNIFICANT CHANGE UP (ref 0–0.2)
BASOPHILS NFR BLD AUTO: 0.3 % — SIGNIFICANT CHANGE UP (ref 0–2)
BILIRUB SERPL-MCNC: 0.4 MG/DL — SIGNIFICANT CHANGE UP (ref 0.2–1.2)
BUN SERPL-MCNC: 15 MG/DL — SIGNIFICANT CHANGE UP (ref 7–23)
CALCIUM SERPL-MCNC: 9.8 MG/DL — SIGNIFICANT CHANGE UP (ref 8.5–10.1)
CHLORIDE SERPL-SCNC: 109 MMOL/L — HIGH (ref 96–108)
CO2 SERPL-SCNC: 22 MMOL/L — SIGNIFICANT CHANGE UP (ref 22–31)
CREAT SERPL-MCNC: 0.81 MG/DL — SIGNIFICANT CHANGE UP (ref 0.5–1.3)
EGFR: 105 ML/MIN/1.73M2 — SIGNIFICANT CHANGE UP
EOSINOPHIL # BLD AUTO: 0.03 K/UL — SIGNIFICANT CHANGE UP (ref 0–0.5)
EOSINOPHIL NFR BLD AUTO: 0.3 % — SIGNIFICANT CHANGE UP (ref 0–6)
GLUCOSE SERPL-MCNC: 105 MG/DL — HIGH (ref 70–99)
HCG SERPL-ACNC: <1 MIU/ML — SIGNIFICANT CHANGE UP
HCT VFR BLD CALC: 37.8 % — SIGNIFICANT CHANGE UP (ref 34.5–45)
HGB BLD-MCNC: 12.2 G/DL — SIGNIFICANT CHANGE UP (ref 11.5–15.5)
IMM GRANULOCYTES NFR BLD AUTO: 0.3 % — SIGNIFICANT CHANGE UP (ref 0–0.9)
LYMPHOCYTES # BLD AUTO: 1.86 K/UL — SIGNIFICANT CHANGE UP (ref 1–3.3)
LYMPHOCYTES # BLD AUTO: 19.8 % — SIGNIFICANT CHANGE UP (ref 13–44)
MCHC RBC-ENTMCNC: 25.8 PG — LOW (ref 27–34)
MCHC RBC-ENTMCNC: 32.3 G/DL — SIGNIFICANT CHANGE UP (ref 32–36)
MCV RBC AUTO: 79.9 FL — LOW (ref 80–100)
MONOCYTES # BLD AUTO: 0.59 K/UL — SIGNIFICANT CHANGE UP (ref 0–0.9)
MONOCYTES NFR BLD AUTO: 6.3 % — SIGNIFICANT CHANGE UP (ref 2–14)
NEUTROPHILS # BLD AUTO: 6.87 K/UL — SIGNIFICANT CHANGE UP (ref 1.8–7.4)
NEUTROPHILS NFR BLD AUTO: 73 % — SIGNIFICANT CHANGE UP (ref 43–77)
NRBC # BLD: 0 /100 WBCS — SIGNIFICANT CHANGE UP (ref 0–0)
PLATELET # BLD AUTO: 342 K/UL — SIGNIFICANT CHANGE UP (ref 150–400)
POTASSIUM SERPL-MCNC: 3.5 MMOL/L — SIGNIFICANT CHANGE UP (ref 3.5–5.3)
POTASSIUM SERPL-SCNC: 3.5 MMOL/L — SIGNIFICANT CHANGE UP (ref 3.5–5.3)
PROT SERPL-MCNC: 7.9 G/DL — SIGNIFICANT CHANGE UP (ref 6–8.3)
RBC # BLD: 4.73 M/UL — SIGNIFICANT CHANGE UP (ref 3.8–5.2)
RBC # FLD: 15.4 % — HIGH (ref 10.3–14.5)
SALICYLATES SERPL-MCNC: <1.7 MG/DL — LOW (ref 2.8–20)
SODIUM SERPL-SCNC: 138 MMOL/L — SIGNIFICANT CHANGE UP (ref 135–145)
WBC # BLD: 9.41 K/UL — SIGNIFICANT CHANGE UP (ref 3.8–10.5)
WBC # FLD AUTO: 9.41 K/UL — SIGNIFICANT CHANGE UP (ref 3.8–10.5)

## 2024-12-03 PROCEDURE — 80307 DRUG TEST PRSMV CHEM ANLYZR: CPT

## 2024-12-03 PROCEDURE — 84702 CHORIONIC GONADOTROPIN TEST: CPT

## 2024-12-03 PROCEDURE — 99285 EMERGENCY DEPT VISIT HI MDM: CPT | Mod: 25

## 2024-12-03 PROCEDURE — 90792 PSYCH DIAG EVAL W/MED SRVCS: CPT | Mod: 95

## 2024-12-03 PROCEDURE — 85025 COMPLETE CBC W/AUTO DIFF WBC: CPT

## 2024-12-03 PROCEDURE — 93005 ELECTROCARDIOGRAM TRACING: CPT

## 2024-12-03 PROCEDURE — 80053 COMPREHEN METABOLIC PANEL: CPT

## 2024-12-03 PROCEDURE — 36415 COLL VENOUS BLD VENIPUNCTURE: CPT

## 2024-12-03 PROCEDURE — 93010 ELECTROCARDIOGRAM REPORT: CPT

## 2024-12-03 PROCEDURE — 99285 EMERGENCY DEPT VISIT HI MDM: CPT

## 2024-12-03 RX ORDER — OLANZAPINE 20 MG/1
5 TABLET ORAL ONCE
Refills: 0 | Status: COMPLETED | OUTPATIENT
Start: 2024-12-03 | End: 2024-12-03

## 2024-12-03 RX ADMIN — OLANZAPINE 5 MILLIGRAM(S): 20 TABLET ORAL at 19:35

## 2024-12-03 RX ADMIN — OLANZAPINE 5 MILLIGRAM(S): 20 TABLET ORAL at 17:50

## 2024-12-03 NOTE — ED PROVIDER NOTE - ATTENDING APP SHARED VISIT CONTRIBUTION OF CARE
Yo ATTG See MDM I performed a history and physical exam of the patient and discussed their management with the Physician assistant reviewed the PAs note and agree with the documented findings and plan of care. My medical decision making and observations are found above.

## 2024-12-03 NOTE — ED BEHAVIORAL HEALTH ASSESSMENT NOTE - HPI (INCLUDE ILLNESS QUALITY, SEVERITY, DURATION, TIMING, CONTEXT, MODIFYING FACTORS, ASSOCIATED SIGNS AND SYMPTOMS)
Patient states "everything is becoming a memory" and "it becomes negative".  States she's never felt this way before.  Reports she's having trouble organizing her thoughts/speaking.  Yesterday she said she had VH of faces/demons.  Reports AH of whispers she finds distressing, can't characterize, denies CAH.  Reports trouble focusing.  Denies PI.  States her mood is "not good".  States poor sleep x2 days.  Notes appetite is ok now, though recently was poor.  Denies SI/HI.    States her ex is now w/ someone who is into witchcraft which is very distressing for her.  Notes they started dating 3 yrs ago.  Denies acute stressors for the past 2 days.  Pt feels like her "third eye opened".      Reports past dx of REZA and MDD.  Denies IPP.  Has an outpt psychiatrist.  Is on  and lexapro 20.  Has been taking meds.  Psychiatrist is Dr. Smith.    Is ambivalent about inpt tx. Patient states "everything is becoming a memory" and "it becomes negative".  States she's never felt this way before.  Reports she's having trouble organizing her thoughts/speaking.  Yesterday she said she had VH of faces/demons.  Reports AH of whispers she finds distressing, can't characterize, denies CAH.  Reports trouble focusing.  Denies PI.  States her mood is "not good".  States poor sleep x2 days.  Notes appetite is ok now, though recently was poor.  Denies SI/HI.    States her ex is now w/ someone who is into witchcraft which is very distressing for her.  Notes they started dating 3 yrs ago.  Denies acute stressors for the past 2 days.  Pt feels like her "third eye opened".      Reports past dx of REZA and MDD.  Denies IPP.  Has an outpt psychiatrist.  Is on  and lexapro 20.  Has been taking meds.  Psychiatrist is Dr. Smith.    Is ambivalent about inpt tx.    I spoke w/ pt's mother.  She says that her father asked her not to follow psychics on her phone and this upset.  States she's not been sleeping much for the past 2 days.  She states she's been attending her classes.  Denies any significant recent stressors.  Denies drug or alcohol use.  Denies any SI/HI.  Overall her mother minimizes sx. Patient is a 23 yo woman, single, no kids, student at [employer], living w parents, PPH of MDD and REZA, in outpt tx w/ Dr. Marilee Smith at [facility], denies past IPP/SA, BIBS for disorganization.      Patient states she came to the hospital because "everything is becoming a memory" and "it becomes negative".  States she's never felt this way before and has trouble characterizing her sx, though notes its lasted the past 2 days.  Reports she's having trouble organizing her thoughts/speaking.  Yesterday she said she had VH of faces/demons.  Reports AH of whispers she finds distressing, can't characterize, denies CAH.  Reports trouble focusing.  Denies PI.  States her mood is "not good".  States poor sleep x2 days.  Notes appetite is ok now, though recently was poor.  Denies SI/HI.    States her ex is now w/ someone who is into witchcraft which is very distressing for her.  Notes they started dating 3 yrs ago.  Denies acute stressors for the past 2 days.  Pt feels like her "third eye opened".      Reports past dx of REZA and MDD.  Denies IPP.  Has an outpt psychiatrist.  Is on  and lexapro 20.  Has been taking meds.  Psychiatrist is Dr. Marilee Smith.    Is ambivalent about inpt tx.    I spoke w/ pt's mother.  She says that pt's father asked her not to follow psychics on her phone and this upset her.  States she's not been sleeping much for the past 2 days.  She states she's been attending her classes.  Denies any significant recent stressors.  Denies drug or alcohol use.  Denies any SI/HI.  Overall her mother minimizes sx, including her disorganization.    I called pt's psychiatrist and left a VM.  Psychiatrist's office number is [phone].

## 2024-12-03 NOTE — ED BEHAVIORAL HEALTH ASSESSMENT NOTE - DETAILS
states is around ppl that "yell a lot" yes states is around ppl that "yell a lot" which she finds traumatic denies lifetime h/o SI

## 2024-12-03 NOTE — ED PROVIDER NOTE - NSFOLLOWUPINSTRUCTIONS_ED_ALL_ED_FT
Please take olanzapine 7.5 mg at night for the next 5 days and urgently follow-up with your psychiatrist.    Hypomania is a condition that affects people who have certain types of bipolar disorder. Hypomania is a mild form of jossie. Jossie is when a person has episodes of increased energy, excitement, or irritability that affect daily life. Unlike jossie, hypomania does not impair functioning and usually lasts most of the day for at least 4 days.    If this condition is not treated, it can become more severe and lead to jossie or depression.    What are the causes?  The cause of this condition is not known.    What increases the risk?  You are more likely to develop this condition if you have a mood disorder, especially bipolar disorder.    If you have a mood disorder, the following factors may increase your risk of developing hypomania:  Not getting enough sleep.  Using a substance such as tobacco, caffeine, or drugs.  Certain prescription medicines, such as antidepressants, steroids, or antibiotics.  Stress or emotional events.  Certain seasons. Hypomania is more common in spring and summer.  The period of time after having a baby (postpartum period).  What are the signs or symptoms?  Symptoms of this condition include:  Very high energy or restlessness.  Less need for sleep.  Very high self-esteem or self-confidence.  Fast or excessive talking or thinking. This may include:  Being unusually talkative, or feeling a need to keep talking. Speech may be very fast. It may seem like you cannot stop talking.  Racing thoughts or constant talking, with quick shifts between topics that may or may not be related (flight of ideas).  Being less able to focus or concentrate.  An increase in certain activity. This includes:  Purposeful activity, such as work, study, or social activity.  Nonproductive activity. This could be pacing, squirming and fidgeting, or finger and toe tapping.  Being more irritable.  Impulsive behavior and poor judgment. These may result in high-risk activities, such as having unprotected sex or spending a lot of money.  How is this diagnosed?  This condition may be diagnosed based on:  Your symptoms and medical history.  A physical exam. Your health care provider will check for physical problems that may be causing your symptoms.  A mental health evaluation. You may be referred to a mental health provider who specializes in diagnosing and treating mood disorders.  How is this treated?  This condition may be treated with:  Medicines, such as mood stabilizers. Your health care provider may also recommend medicines to help you sleep, if needed.  Talk therapy (psychotherapy) with a mental health provider.  Follow these instructions at home:  A person writing in a journal.   Take over-the-counter and prescription medicines only as told by your health care provider.  Avoid using caffeine, tobacco, alcohol, and other potentially harmful substances.  Consider keeping a journal. Write down a daily log of your mood changes, sleep habits, life events, and medicines. Share this information with your health care provider or therapist.  Find support from relatives or friends who can help you manage your symptoms.  Keep all follow-up visits. This is important.  Where to find support  The National [place] on Mental Illness: dejah.org  Substance Abuse and Mental Health Services Administration: samhsa.gov  Contact a health care provider if:  Your symptoms do not improve or they get worse.  You are thinking about changing your medicine or treatment program.  You have side effects from your prescription medicines.  Get help right away if:  You are having a mental health crisis.  You are thinking about harming yourself or thinking about suicide.  If you ever feel like you may hurt yourself or others, or have thoughts about taking your own life, get help right away. Go to your nearest emergency department or:  Call your local emergency services (911 in the [place]  Call a suicide crisis helpline, such as the National Suicide Prevention Lifeline at 1-892.826.3410 or 318 in the [place] This is open 24 hours a day in the [place]  If you’re a [place]:  Call 988 and press 1. This is open 24 hours a day.  Text the Veterans Crisis Line at 210335.  Summary  Hypomania is a mild form of jossie. Jossie is when someone feels increased energy, excitement, or irritability that affects daily life. Hypomania does not impair functioning and usually lasts most of the day for at least 4 days.  Symptoms of hypomania may include very high energy or restlessness, unusual talkativeness, and not being able to focus or concentrate.  Treatment for this condition may include mood stabilizer medicine and talk therapy (psychotherapy) with a mental health provider.  This information is not intended to replace advice given to you by your health care provider. Make sure you discuss any questions you have with your health care provider.

## 2024-12-03 NOTE — ED ADULT NURSE NOTE - OBJECTIVE STATEMENT
pt presents with c/u auditory and visual hallucinations x 2 days. pt states she does not have HI / SI ideation and never has. she reports never having tried to harm herself or others. pt has hx of major depressive disorder. reports she hasn't slept in 2 days. states there is a lot of fighting in her house that triggers her symptoms. takes wellbutrin, lexapro, and propranolol. states there was 1 week last month that she was without propranolol bc her dr would not giver her RX because she missed too many appointments. pt states this has happened in 2020 and was evaluated in the hospital but did not receive the care that she needs. pt room checked for safety, unneeded items removed and visual from nursing station. pt states she is uncomfortable changing. escalated to nurse manager in real time.

## 2024-12-03 NOTE — ED PROVIDER NOTE - PROGRESS NOTE DETAILS
Yo ATTG Patient reporting she is having increasing thoughts of hearing things in her head. spoke w/ pts outpt psych team :     pt has been calling office non stop, sending emails,  recent change:  decreased welbturin 150 from 300, started on propanolol as well, has seen in office for few months     [phone] Dr Smith (Cell phone)  - stated he would appreciate in house psych call [facility] awaiting callback from patient's psychiatrist.  Asking for patient to receive 5 mg p.o. Zyprexa and will reassess. Yo ATTG   Patient cleared by psych will discharge on olanzapine 7.5 mg once at night.

## 2024-12-03 NOTE — ED ADULT NURSE NOTE - CHIEF COMPLAINT QUOTE
pt ambulated into triage with mom. requesting psych eval states she is hallucinating demons, demons will ask her why she isn't getting herself help. speech is disorganized in triage, denies feelings of SI and HI pt is cooperative in triage. pt arrived in ED covered in vomit. pt cleaned.

## 2024-12-03 NOTE — ED PROVIDER NOTE - PATIENT PORTAL LINK FT
You can access the FollowMyHealth Patient Portal offered by [facility] by registering at the following website: [email or web address]. By joining [employer]’s FollowMyHealth portal, you will also be able to view your health information using other applications (apps) compatible with our system.

## 2024-12-03 NOTE — ED PROVIDER NOTE - CLINICAL SUMMARY MEDICAL DECISION MAKING FREE TEXT BOX
Yo ENCINAS      24-year-old female chief complaint of needing psychiatric evaluation.  Patient sees a psychiatrist and has been diagnosed with depression he does not know if she is on any chronic medications but states since yesterday has been having loose nations.  Patient denies any SI or HI but states she is having sometimes thoughts of hearing voices that are telling her to do things which she tries to avoid them.  Patient has a very tangential thought process and is unable to focus on 1 topic for a long period of time    Patient states that she has told her mother about the symptoms ultimately came into the ED for such    GENERAL: Awake, alert, NAD  Lungs Non labored breathing   BACK:  no deformities.  NEURO: A&Ox3. Moving all extremities.  SKIN: Warm and dry. No rash.  PSYCH: Normal affect.  tangenital thought process denies si / hi

## 2024-12-03 NOTE — ED BEHAVIORAL HEALTH ASSESSMENT NOTE - SUMMARY
Differential for pt's current presentation is broad and includes micro psychotic episode, trauma related sx, SSRI induced hypomania, substance induced psychosis (u tox pending, pt denies substance use), or other unspecified psychosis.  On exam she is disorganized but unclear if there are actual safety concerns.  She is not currently able to engage in treatment/safety planning however.  Her mother unable to provide reliable collateral, and VM left for pt's psychiatrist.  Will plan to give 5 mg PO zyprexa now, hold antidepressants, and r/a pending clinical response to medication and collateral from her psychiatrist.

## 2024-12-03 NOTE — ED ADULT NURSE REASSESSMENT NOTE - DESCRIPTION
Received pt from [facility].  Pt resting comfortably in bed in hallway, does not appear to be in any distress but states she is feeling extremely anxious and feels like her hands are not her hands.  Dr. Chen aware.  Pt given zyprexa 5mg po as per MD order, will reevaluate.  Denies chest pain or SOB.  No n/v/d.  Maintain safety precautions.

## 2024-12-03 NOTE — ED BEHAVIORAL HEALTH ASSESSMENT NOTE - NSBHMSEAFFQUAL_PSY_A_CORE
Thank you for attending our Joint Replacement class today in preparation for your upcoming surgery.  Topics discussed include:    MyChart Enrollment  Communication with Care Team  My Chart is the best form of communication to reach all of your caregivers  You can send messages to specific care givers, or a care team  Continued Education  You will be enrolled in a Total Joint Replacement care plan to receive additional education before and after surgery  You can review a short recording of the class content  Access to Medical Records  You can access test results, office notes, appointments, etc.  You can connect to other healthcare systems who use [employer] ([facility], [facility], [facility], etc.)  [place].  Program Information  Consent to Enroll    Background/Understanding of Joint Replacement Surgery  Potential for same day discharge  Any questions or concerns to be directed to the surgeon's office    How to Prepare for Surgery  Use of Nicotine Products/Smoking  Stop several weeks before surgery  Such products slow down the healing process and increase risk of post-op infection and complications  Clearance for Surgery  Medical Clearance by Specialists  Dental Clearance  Cracked/Broken/Loose teeth left untreated may postpone surgery  The importance of post-op antibiotics for dental visits per surgeon protocol  Preadmission Testing  **Potential for postponed surgery if appropriate clearance is not obtained  Medication Instruction  Follow instructions provided by the doctor who prescribes your medication (typically, but not limited to cardiologist)  Preadmission testing will provide additional instructions during your appointment on what to stop and what to take as you get closer to surgery  For clarification of these instructions, please call preadmission testing directly - [phone]  Tips for Preparing the home for discharge from the hospital  Care Partner  Requirement for surgery, the patient must have a plan to have  help at home  Potential for postponed surgery if plan for home support cannot be established  How the care partner can help after surgery  CHG Body Wash/Mouth Wash  Follow the instructions given at preadmission testing  Body wash is to be used on the body and hair for 5 washes  Mouthwash is to be used the night before and morning of surgery  **This is a system-wide protocol developed by infectious disease professionals, we will not alter our recommendations for those with sensitive skin or those who have special hair needs.  Please follow the instructions as they are written as this will provide the best infection prevention measures for surgery.  Should you have an allergy to one of the products, please discuss with your preadmission team**    What to Expect in the Hospital/At Home  Morning of Surgery NPO Guidelines  Nothing to eat after midnight  Water can be consumed up to 2 hours prior to arrival  Surgical and Post-Surgical Care Team  Surgical Team  Anesthesia Team  Nursing  Physical Therapy  Care Coordinating  Pharmacy  Hospital Arrival Instructions  Arrive at the time provided to you  Consider traffic patterns (rush-hour) based on arrival time  Have arrangements made for a ride home  If discharging same day, care partner should remain at the hospital  Recovering after Surgery  Recovery Room - Visitors are not brought back  Transition to hospital room - 2nd Floor, Visitors will be directed to your room  The presence of and strategies for controlling surgical pain and swelling  The importance of early mobility  Side effects after surgery  What to expect if staying overnight    Discharge Planning  The intended plan for discharge will be for patients to discharge home  All patients require a care partner (family, friend, neighbor, etc.) to stay with the patient for the first few nights after surgery  The inability to secure help at home will postpone surgery  Home Care Services set up per surgeon order  Physical  Therapy  Occupational Therapy  **If desired, private duty care can be arranged by the patient ahead of time**  Outpatient Physical Therapy per surgeon order    Recovering at Home  Wound Care  Follow wound care instructions found in your discharge paperwork  Bandage is water-resistant and you may shower with the bandage  Do not scrub directly over the bandage  Do not submerge in water until cleared (bathtub, hot tub, pool, etc.)    Post-Op Risk Prevention  Infection Prevention  Promptly seek treatment for any infections post-operatively  Routine dental visits must be postponed for 3 months after surgery  Your surgeon may require antibiotics prior to future dental visits  Any concerns for infection not related directly to the knee or the hip should be managed by your primary care provider  Blood Clots  Be sure to complete the course of blood thinning medication as prescribed by your surgeon  Movement every 1-2 hours during the day is encouraged to prevent blood clots  Monitor for signs of blood clots  Wear compression stockings as prescribed by your surgeon  Constipation  Constipation is common following surgery  Drink plenty of fluids  Take stool softener/laxative as prescribed by your surgeon  Move around frequently  Eat foods high in fiber  Fall Prevention  Prepare home ahead of time to clear space to move with walker  Remove throw rugs and electrical cords from walkways  Install railings near any stairways with more than 2 steps  Use night lights for increased visibility at night  Continue to use your assistive device until cleared by surgeon or physical therapy  Dislocation Prevention - Not all procedures will have dislocation precautions  Follow dislocation precautions provided by your surgeon  It is OK to resume sexual activity about 6 weeks following surgery  Be sure to follow any dislocation precautions assigned    Durable Medical Equipment  Cold Therapy  Breg Cold Therapy Machines  Ice/Gel Packs  Assistive  Devices  Folding Walker with Wheels (in the front only)  No Rollators  Crutches if approved by Physical Therapy and Surgeon after surgery  Hip Kits  Raised Toilet Seats  Additional Compression Stockings    Joint Preservation  Healthy Activities when Cleared  Walking  Swimming  Bike Riding  Activities to Avoid  Refrain from repetitive motions which have a high impact on the joint  Gradual Progression  Progress activity slowly, listen to your body  Common Findings - NORMAL after surgery  Clicking/Grinding  Numbness near incision    Physical Therapy  Prehabilitation exercises  START TODAY ON BOTH LEGS  Surgery Specific Precautions  Follow surgery specific precautions found in your discharge paperwork    Follow-Up Visit  All patients will see their surgeon for a follow up visit after surgery  The visit may range from 2-6 weeks after surgery and is surgeon specific      Please don't hesitate to reach out if you have any additional questions or concerns.    Denise Singer MBA, BSN, RN-BC  ONESIMO HolleyN, RN  [facility]  [facility]   [phone]     [employer]

## 2024-12-03 NOTE — ED PROVIDER NOTE - OBJECTIVE STATEMENT
Yo ATTG See MDM Yo ATTG See MDM    24-year-old female history of anxiety and depression, on Wellbutrin, Lexapro, propranolol, walked into the ED accompanied by mother for psych evaluation.  Difficult to get detailed information from patient due to tangential talking but reporting that she has not slept well in the past 2 days and keeps hearing and seeing demons.  Denies drugs, alcohol, tobacco.  Denies wanting to hurt herself or anyone else.  Denies prior psychiatric admission.

## 2024-12-04 ENCOUNTER — NON-APPOINTMENT (OUTPATIENT)
Age: 24
End: 2024-12-04

## 2024-12-04 VITALS
DIASTOLIC BLOOD PRESSURE: 74 MMHG | SYSTOLIC BLOOD PRESSURE: 110 MMHG | HEART RATE: 75 BPM | OXYGEN SATURATION: 100 % | RESPIRATION RATE: 17 BRPM | TEMPERATURE: 98 F

## 2024-12-04 PROCEDURE — 99215 OFFICE O/P EST HI 40 MIN: CPT | Mod: 95

## 2024-12-04 RX ORDER — OLANZAPINE 20 MG/1
1 TABLET ORAL
Qty: 5 | Refills: 0
Start: 2024-12-04 | End: 2024-12-08

## 2024-12-04 RX ORDER — BUPROPION HCL 100 MG
300 TABLET ORAL ONCE
Refills: 0 | Status: ACTIVE | OUTPATIENT
Start: 2024-12-04 | End: 2024-12-04

## 2024-12-04 RX ORDER — ESCITALOPRAM OXALATE 10 MG/1
20 TABLET, FILM COATED ORAL ONCE
Refills: 0 | Status: ACTIVE | OUTPATIENT
Start: 2024-12-04 | End: 2024-12-04

## 2024-12-04 NOTE — ED BEHAVIORAL HEALTH PROGRESS NOTE - SUMMARY
pt was held for further assessment & collateral given her presentation of brief disorganization and psychotic like symptoms.

## 2024-12-04 NOTE — ED ADULT NURSE REASSESSMENT NOTE - NS ED NURSE REASSESS COMMENT FT1
Awake in stretcher in hallway.  VSS.  No c/o anykind.
constant obs d/c after as per PA/ MD, pt is not in any distress. resting comfortably. mom at bedside.
[facility] pt is anxious but resting,
pt is feeling better, took meds as ordered. offered food, not hungry. water provided.
pt wanted more medication for anxiety. medication ordered but held as pt is now sleeping
see note above

## 2024-12-04 NOTE — ED BEHAVIORAL HEALTH PROGRESS NOTE - CASE SUMMARY/FORMULATION (CLEARLY DOCUMENT RATIONALE FOR DISPOSITION CHANGE)
This is a 25 yo female, college student, previously treated for depression but no prior hx of henry or psychosis, currently in outpatient care and never psychiatrically hospitalized, who now presents with 1-2 days of poor sleep, odd behavior, transient paranoia and auditory hallucinations which are now resolved. Pt still shows some residual abnormalities including being excessively talkative and still making slightly odd references to things like witchcraft which contributed to her high anxiety yesterday, but she has also gained better insight currently and recognizes the behavior yesterday was abnormal. Patient has no SI or aggressive thoughts and does not demonstrate any significant impairment in ability to continue caring for herself. Her mother also is supportive of her returning home and has no safety concerns. Patient is exhibiting symptoms consistent with hypomania vs brief psychosis. Differential now includes bipolar disorder vs psychotic disorder vs PTSD. Pt can safely continue outpatient management. Will start olanzapine 7.5mg qhs; discussed w/ outpatient psychiatrist impression and plan.

## 2024-12-04 NOTE — ED BEHAVIORAL HEALTH PROGRESS NOTE - DETAILS:
On evaluation this morning, the patient reports feeling "much better." She is still somewhat odd and hyperverbal and vague in her understanding or description of her symptoms yesterday, for example talking about "something triggered me" and "certain things going on," and being paranoid about witchcraft and astrology in the setting of being usually interested in psychics. Also describes "too much going on in my head" and racing thoughts and auditory hallucinations. However, she does recognize that her behaviors yesterday were very out of character, and she also recognizes that she doesn't have full clarity of her behaviors now because of her impaired insight during the time. She reports it was only yesterday that she felt acutely paranoid and was hearing things. Now denies any symptoms like this but is focused on making sure these symptoms don't return. Is amenable to continuing olanzapine at 7.5mg for a week and ensuring follow-up with her psychiatrist. Also educated on SSRI potentially exacerbating hypomanic/manic sx Denies any substance use.    Writer spoke to pt's mother again who consistently denies any safety concerns and emphasizes she will ensure patient follows up with Dr. Smith. Psychoeducation provided around continuing to monitor for symptoms of psychosis and bipolar disorder.    Writer spoke to Dr. Smith to coordinate care and update on medication changes including starting olanzapine 7.5mg qhs and reducing Lexapro from 30mg to 20mg. Dr. Smith in agreement with plan. Corroborates this is the first incident of reported hallucinations and werner paranoia.

## 2024-12-04 NOTE — ED BEHAVIORAL HEALTH NOTE - BEHAVIORAL HEALTH NOTE
========================  FOR EACH COLLATERAL  ========================  Collateral below has requested that the information provided remain confidential: Yes [  ] No [x  ]  Collateral below has provided information that patient is/may be unaware of: Yes [  ] No [ x ]  Patient gives permission to obtain collateral from _____:  (  ) Yes  ( x )  No  Rationale for overriding objection            (  ) Lack of capacity. Details: ________            ( x ) Assessing risk of danger to self/others. Details: ________  Rationale for selecting specific collateral source            (  ) Potential to impact risk of danger to self/others and no alternative equivalent. Details: _____  NAME:  cortez Paulino   NUMBER: [phone]  RELATIONSHIP: [facility] [place]  RELIABILITY: [employer]   COMMENTS: 9:35a  Counselor called to speak to pt's psychiatrist but the  reported that they would send this request to the physician and the physician will call telepsych back for collateral. Off note-Their business hours end early/430p that's why they were not able to reach pt's psychiatrist yesterday.

## 2024-12-17 ENCOUNTER — APPOINTMENT (OUTPATIENT)
Dept: PSYCHIATRY | Facility: CLINIC | Age: 24
End: 2024-12-17
Payer: COMMERCIAL

## 2024-12-17 PROCEDURE — 99214 OFFICE O/P EST MOD 30 MIN: CPT

## 2025-09-01 ENCOUNTER — NON-APPOINTMENT (OUTPATIENT)
Age: 25
End: 2025-09-01